# Patient Record
Sex: MALE | Race: OTHER | NOT HISPANIC OR LATINO | URBAN - METROPOLITAN AREA
[De-identification: names, ages, dates, MRNs, and addresses within clinical notes are randomized per-mention and may not be internally consistent; named-entity substitution may affect disease eponyms.]

---

## 2023-04-22 ENCOUNTER — EMERGENCY (EMERGENCY)
Facility: HOSPITAL | Age: 28
LOS: 1 days | Discharge: HOME | End: 2023-04-22
Attending: EMERGENCY MEDICINE | Admitting: EMERGENCY MEDICINE
Payer: COMMERCIAL

## 2023-04-22 VITALS
HEART RATE: 124 BPM | DIASTOLIC BLOOD PRESSURE: 91 MMHG | SYSTOLIC BLOOD PRESSURE: 160 MMHG | TEMPERATURE: 97 F | OXYGEN SATURATION: 99 % | WEIGHT: 179.9 LBS | RESPIRATION RATE: 18 BRPM | HEIGHT: 72 IN

## 2023-04-22 VITALS
HEART RATE: 80 BPM | OXYGEN SATURATION: 96 % | TEMPERATURE: 98 F | RESPIRATION RATE: 18 BRPM | DIASTOLIC BLOOD PRESSURE: 71 MMHG | SYSTOLIC BLOOD PRESSURE: 123 MMHG

## 2023-04-22 DIAGNOSIS — R51.9 HEADACHE, UNSPECIFIED: ICD-10-CM

## 2023-04-22 DIAGNOSIS — Z72.820 SLEEP DEPRIVATION: ICD-10-CM

## 2023-04-22 DIAGNOSIS — F17.290 NICOTINE DEPENDENCE, OTHER TOBACCO PRODUCT, UNCOMPLICATED: ICD-10-CM

## 2023-04-22 LAB
ALBUMIN SERPL ELPH-MCNC: 4.5 G/DL — SIGNIFICANT CHANGE UP (ref 3.3–5)
ALP SERPL-CCNC: 58 U/L — SIGNIFICANT CHANGE UP (ref 40–120)
ALT FLD-CCNC: 12 U/L — SIGNIFICANT CHANGE UP (ref 10–45)
ANION GAP SERPL CALC-SCNC: 10 MMOL/L — SIGNIFICANT CHANGE UP (ref 5–17)
APAP SERPL-MCNC: <5 UG/ML — LOW (ref 10–30)
AST SERPL-CCNC: 18 U/L — SIGNIFICANT CHANGE UP (ref 10–40)
BASOPHILS # BLD AUTO: 0.09 K/UL — SIGNIFICANT CHANGE UP (ref 0–0.2)
BASOPHILS NFR BLD AUTO: 1 % — SIGNIFICANT CHANGE UP (ref 0–2)
BILIRUB SERPL-MCNC: 0.5 MG/DL — SIGNIFICANT CHANGE UP (ref 0.2–1.2)
BUN SERPL-MCNC: 16 MG/DL — SIGNIFICANT CHANGE UP (ref 7–23)
CALCIUM SERPL-MCNC: 8.9 MG/DL — SIGNIFICANT CHANGE UP (ref 8.4–10.5)
CHLORIDE SERPL-SCNC: 100 MMOL/L — SIGNIFICANT CHANGE UP (ref 96–108)
CO2 SERPL-SCNC: 28 MMOL/L — SIGNIFICANT CHANGE UP (ref 22–31)
CREAT SERPL-MCNC: 1.39 MG/DL — HIGH (ref 0.5–1.3)
EGFR: 70 ML/MIN/1.73M2 — SIGNIFICANT CHANGE UP
EOSINOPHIL # BLD AUTO: 0.83 K/UL — HIGH (ref 0–0.5)
EOSINOPHIL NFR BLD AUTO: 8.8 % — HIGH (ref 0–6)
ETHANOL SERPL-MCNC: <10 MG/DL — SIGNIFICANT CHANGE UP (ref 0–10)
GLUCOSE SERPL-MCNC: 99 MG/DL — SIGNIFICANT CHANGE UP (ref 70–99)
HCT VFR BLD CALC: 46.3 % — SIGNIFICANT CHANGE UP (ref 39–50)
HGB BLD-MCNC: 15.7 G/DL — SIGNIFICANT CHANGE UP (ref 13–17)
IMM GRANULOCYTES NFR BLD AUTO: 0.3 % — SIGNIFICANT CHANGE UP (ref 0–0.9)
LYMPHOCYTES # BLD AUTO: 3 K/UL — SIGNIFICANT CHANGE UP (ref 1–3.3)
LYMPHOCYTES # BLD AUTO: 31.7 % — SIGNIFICANT CHANGE UP (ref 13–44)
MCHC RBC-ENTMCNC: 29.5 PG — SIGNIFICANT CHANGE UP (ref 27–34)
MCHC RBC-ENTMCNC: 33.9 GM/DL — SIGNIFICANT CHANGE UP (ref 32–36)
MCV RBC AUTO: 87 FL — SIGNIFICANT CHANGE UP (ref 80–100)
MONOCYTES # BLD AUTO: 0.97 K/UL — HIGH (ref 0–0.9)
MONOCYTES NFR BLD AUTO: 10.3 % — SIGNIFICANT CHANGE UP (ref 2–14)
NEUTROPHILS # BLD AUTO: 4.53 K/UL — SIGNIFICANT CHANGE UP (ref 1.8–7.4)
NEUTROPHILS NFR BLD AUTO: 47.9 % — SIGNIFICANT CHANGE UP (ref 43–77)
NRBC # BLD: 0 /100 WBCS — SIGNIFICANT CHANGE UP (ref 0–0)
PLATELET # BLD AUTO: 204 K/UL — SIGNIFICANT CHANGE UP (ref 150–400)
POTASSIUM SERPL-MCNC: 4 MMOL/L — SIGNIFICANT CHANGE UP (ref 3.5–5.3)
POTASSIUM SERPL-SCNC: 4 MMOL/L — SIGNIFICANT CHANGE UP (ref 3.5–5.3)
PROT SERPL-MCNC: 6.7 G/DL — SIGNIFICANT CHANGE UP (ref 6–8.3)
RBC # BLD: 5.32 M/UL — SIGNIFICANT CHANGE UP (ref 4.2–5.8)
RBC # FLD: 12.6 % — SIGNIFICANT CHANGE UP (ref 10.3–14.5)
SALICYLATES SERPL-MCNC: <0.3 MG/DL — LOW (ref 2.8–20)
SODIUM SERPL-SCNC: 138 MMOL/L — SIGNIFICANT CHANGE UP (ref 135–145)
TROPONIN T SERPL-MCNC: 0.01 NG/ML — SIGNIFICANT CHANGE UP (ref 0–0.01)
WBC # BLD: 9.45 K/UL — SIGNIFICANT CHANGE UP (ref 3.8–10.5)
WBC # FLD AUTO: 9.45 K/UL — SIGNIFICANT CHANGE UP (ref 3.8–10.5)

## 2023-04-22 PROCEDURE — 96375 TX/PRO/DX INJ NEW DRUG ADDON: CPT | Mod: XU

## 2023-04-22 PROCEDURE — 71045 X-RAY EXAM CHEST 1 VIEW: CPT

## 2023-04-22 PROCEDURE — 70450 CT HEAD/BRAIN W/O DYE: CPT | Mod: MA

## 2023-04-22 PROCEDURE — 84484 ASSAY OF TROPONIN QUANT: CPT

## 2023-04-22 PROCEDURE — 80307 DRUG TEST PRSMV CHEM ANLYZR: CPT

## 2023-04-22 PROCEDURE — 70450 CT HEAD/BRAIN W/O DYE: CPT | Mod: 26,MA,59

## 2023-04-22 PROCEDURE — 70498 CT ANGIOGRAPHY NECK: CPT | Mod: 26,MA

## 2023-04-22 PROCEDURE — 99285 EMERGENCY DEPT VISIT HI MDM: CPT

## 2023-04-22 PROCEDURE — 36415 COLL VENOUS BLD VENIPUNCTURE: CPT

## 2023-04-22 PROCEDURE — 70496 CT ANGIOGRAPHY HEAD: CPT | Mod: MA

## 2023-04-22 PROCEDURE — 85025 COMPLETE CBC W/AUTO DIFF WBC: CPT

## 2023-04-22 PROCEDURE — 99284 EMERGENCY DEPT VISIT MOD MDM: CPT | Mod: 25

## 2023-04-22 PROCEDURE — 71045 X-RAY EXAM CHEST 1 VIEW: CPT | Mod: 26

## 2023-04-22 PROCEDURE — 96374 THER/PROPH/DIAG INJ IV PUSH: CPT | Mod: XU

## 2023-04-22 PROCEDURE — 70498 CT ANGIOGRAPHY NECK: CPT | Mod: MA

## 2023-04-22 PROCEDURE — 80053 COMPREHEN METABOLIC PANEL: CPT

## 2023-04-22 PROCEDURE — 70496 CT ANGIOGRAPHY HEAD: CPT | Mod: 26,MA

## 2023-04-22 RX ORDER — METOCLOPRAMIDE HCL 10 MG
10 TABLET ORAL ONCE
Refills: 0 | Status: COMPLETED | OUTPATIENT
Start: 2023-04-22 | End: 2023-04-22

## 2023-04-22 RX ADMIN — Medication 104 MILLIGRAM(S): at 04:30

## 2023-04-22 RX ADMIN — Medication 1 MILLIGRAM(S): at 04:30

## 2023-04-22 NOTE — ED PROVIDER NOTE - PATIENT PORTAL LINK FT
You can access the FollowMyHealth Patient Portal offered by [facility] by registering at the following website: [email or web address]. By joining [email or web address]’s FollowMyHealth portal, you will also be able to view your health information using other applications (apps) compatible with our system.

## 2023-04-22 NOTE — ED ADULT NURSE NOTE - CINV DISCH TEACH PARTICIP
[Follow-Up - Routine Clinic] : a routine clinic follow-up of [None] : No associated symptoms are reported [Good Compliance] : good compliance with treatment [Good Tolerance] : good tolerance of treatment [Good Symptom Control] : good symptom control [de-identified] : APAP Patient/Family

## 2023-04-22 NOTE — ED PROVIDER NOTE - CONSTITUTIONAL, MLM
normal... Well appearing, awake, alert, oriented to person, place, time/situation and in mild distress. anxious appearing

## 2023-04-22 NOTE — ED PROVIDER NOTE - CLINICAL SUMMARY MEDICAL DECISION MAKING FREE TEXT BOX
HA, bitemporal, no focal neuro deficits. likely related to sleep deprivation and caffeine ingestion, less likely cva  -check labs  -ekg  -CXR  -CT/CTA head/neck   -reglan, ativan

## 2023-04-22 NOTE — ED ADULT TRIAGE NOTE - CHIEF COMPLAINT QUOTE
Pt presents with c/o sudden onset "bilateral head pain (to temples) approx 2-3 hours ago. Reports "no sleep x 48 hours" and has been taking OTC stimulants.

## 2023-04-22 NOTE — ED PROVIDER NOTE - OBJECTIVE STATEMENT
28M no PMH c/o HA. pt states he has not slept in past 50hrs, due to work.  has been taking multiple caffeine pills during the day and also drinking caffeine. states tonight he laid down to go to sleep and felt intense headache to both temples.  felt like his heart was racing. no vomiting, no dizziness. no SOB. no numbness/weakness. took tylenol without relief. also took CBD for pain. no fevers. no recent travel.

## 2023-04-22 NOTE — ED PROVIDER NOTE - NSFOLLOWUPINSTRUCTIONS_ED_ALL_ED_FT
General Headache    WHAT YOU NEED TO KNOW:    Headache pain may be mild or severe. Common causes include stress, medicines, and head injuries. Sleep problems, allergies, and hormone changes can also cause a headache. You may have frequent headaches that have no clear cause. Pain may start in another part of your body and move to your head. Headache pain can also move to other parts of your body. A headache can cause other symptoms, such as nausea and vomiting. A severe headache may be a sign of a stroke or other serious problem that needs immediate treatment.    DISCHARGE INSTRUCTIONS:    Have someone call your local emergency number (911 in the [place]) for any of the following:    You have any of the following signs of a stroke:  Numbness or drooping on one side of your face    Weakness in an arm or leg    Confusion or difficulty speaking    Dizziness, a severe headache, or vision loss    Return to the emergency department if:    You have a headache with neck stiffness and a fever.    You have a constant headache and are vomiting.    You have severe pain that does not get better after you take pain medicine.    You have a headache and the pain worsens when you look into light.    You have a headache and vision changes, such as blurred vision.    You have a headache and are forgetful or confused.  Call your doctor if:    You have a headache each day that does not get better, even after treatment.    You have changes in your headaches, or new symptoms that occur when you have a headache.    Others you live or work with also have headaches.    You have questions or concerns about your condition or care.  Medicines: You may need any of the following:    Medicines may be given to prevent or treat headache pain. Do not wait until the pain is severe to take your medicine. Ask your healthcare provider how to take the medicine safely.    NSAIDs, such as ibuprofen, help decrease swelling, pain, and fever. This medicine is available with or without a doctor's order. NSAIDs can cause stomach bleeding or kidney problems in certain people. If you take blood thinner medicine, always ask if NSAIDs are safe for you. Always read the medicine label and follow directions. Do not give these medicines to children younger than 6 months without direction from a healthcare provider.    Acetaminophen decreases pain and fever. It is available without a doctor's order. Ask how much to take and how often to take it. Follow directions. Read the labels of all other medicines you are using to see if they also contain acetaminophen, or ask your doctor or pharmacist. Acetaminophen can cause liver damage if not taken correctly.    Antinausea medicine may be given to calm your stomach and help prevent vomiting.    Take your medicine as directed. Contact your healthcare provider if you think your medicine is not helping or if you have side effects. Tell your provider if you are allergic to any medicine. Keep a list of the medicines, vitamins, and herbs you take. Include the amounts, and when and why you take them. Bring the list or the pill bottles to follow-up visits. Carry your medicine list with you in case of an emergency.  Manage your symptoms:    Rest in a dark and quiet room. This may help decrease your pain.    Apply heat or ice as directed. Heat or ice may help decrease pain or muscle spasms. Apply heat or ice on the area for 20 minutes every 2 hours for as many days as directed. Your healthcare provider may recommend that you alternate heat and ice.    Relax your muscles to help relieve a headache. Lie down in a comfortable position and close your eyes. Relax your muscles slowly. Start at your toes and work your way up your body. A massage or warm bath may also help relax your muscles.  Keep a headache record: Record the dates and times that you get headaches, and what you were doing before the headache started. Also record what you ate and drank in the 24 hours before the headache started. This might help your healthcare provider find the cause of your headaches and make a treatment plan. The record can also help you avoid headache triggers or manage your symptoms.    Get enough sleep: You should get 8 to 10 hours of sleep each night. Create a sleep schedule. Go to bed and wake up at the same times each day. It may be helpful to do something relaxing before bed. Do not watch television right before bed.    Do not smoke: Nicotine and other chemicals in cigarettes and cigars can trigger a headache or make it worse. Ask your healthcare provider for information if you currently smoke and need help to quit. E-cigarettes or smokeless tobacco still contain nicotine. Talk to your healthcare provider before you use these products.    Drink liquids as directed: You may need to drink more liquid to prevent dehydration. Dehydration can cause a headache. Ask your healthcare provider how much liquid to drink each day and which liquids are best for you.    Limit caffeine and alcohol as directed: Your headaches may be triggered by caffeine or alcohol. You may also develop a headache if you drink caffeine regularly and suddenly stop.    Eat a variety of healthy foods: Do not skip meals. Too little food can trigger a headache. Include fruits, vegetables, whole-grain breads, low-fat dairy products, beans, lean meat, and fish. Do not have trigger foods, such as chocolate and red wine. Foods that contain gluten, nitrates, MSG, or artificial sweeteners may also trigger a headache.    Follow up with your doctor as directed: Write down your questions so you remember to ask them during your visits.    Caffeine Use    WHAT YOU NEED TO KNOW:    Caffeine is a stimulant that gives you a burst of energy and keeps you awake. Caffeine can also raise your blood pressure and heart rate. Caffeine occurs naturally in cocoa beans, tea leaves, and coffee beans. It is also added to drinks, food, supplements, medicines, and herbal products.    DISCHARGE INSTRUCTIONS:    Side effects of caffeine: Large amounts of caffeine may cause an upset stomach, shakiness, dizziness, headaches, and trouble focusing and sleeping. The amount of caffeine that would cause these side effects depends on how sensitive you are to caffeine. It also depends on how much caffeine you normally have. You may have side effects with only 1 cup of coffee if you normally do not have caffeine.    Safe intake of caffeine: Caffeine is measured in milligrams (mg). A safe amount for healthy adults is less than 400 mg per day. Pregnant women should limit their caffeine intake to less than 200 mg per day. People with certain medical conditions, children, and older adults may be more sensitive to the effects of caffeine. Talk to your healthcare provider about the amount of caffeine that is safe for you.    Amount of caffeine found in drinks, foods, and medicines: The following list shows the general amount of caffeine that is found in foods, beverages, and medicines. Different brands may have slightly different amounts of caffeine.    16 ounces of energy drinks: 50 mg to 500 mg    12 ounces of caffeinated soda: 23 to 64 mg    5 ounces of coffee (brewed, drip): 60 to 150 mg    6 ounces of instant coffee: 74 mg    5 ounces of brewed tea: 40 to 80 mg    8 ounces of instant tea: 20 to 30 mg    1 bar (41 grams) of dark chocolate: 31 mg    1 ounce of milk chocolate: 1 to 15 mg    5 ounces of hot cocoa: 1 to 8 mg    4 ounces of coffee ice cream: 1 to 45 grams    Weight loss medicine: 300 mg    Medicines for drowsiness: 200 mg    Pain relievers: 64 to 130 mg    Medicine for premenstrual symptoms: 120 mg  Symptoms of caffeine withdrawal: Caffeine withdrawal can occur if you drink more than 200 mg of caffeine each day for at least 2 weeks and then stop. It can occur within 24 hours of stopping or delaying your intake of caffeine. Symptoms of caffeine withdrawal may include headache, sleepiness, nausea, and muscle aches. You may also have trouble concentrating and feel irritable or depressed. Please rest and remain well hydrated with plenty of fluids.  You can take motrin 600-800mg and tylenol 650mg every 3 hours, switching between the two for pain/bodyaches or fevers (>100.4F/>38C)    Please call to arrange follow up with primary care doctor within one week    General Headache    WHAT YOU NEED TO KNOW:    Headache pain may be mild or severe. Common causes include stress, medicines, and head injuries. Sleep problems, allergies, and hormone changes can also cause a headache. You may have frequent headaches that have no clear cause. Pain may start in another part of your body and move to your head. Headache pain can also move to other parts of your body. A headache can cause other symptoms, such as nausea and vomiting. A severe headache may be a sign of a stroke or other serious problem that needs immediate treatment.    DISCHARGE INSTRUCTIONS:    Have someone call your local emergency number (911 in the [place]) for any of the following:    You have any of the following signs of a stroke:  Numbness or drooping on one side of your face    Weakness in an arm or leg    Confusion or difficulty speaking    Dizziness, a severe headache, or vision loss    Return to the emergency department if:    You have a headache with neck stiffness and a fever.    You have a constant headache and are vomiting.    You have severe pain that does not get better after you take pain medicine.    You have a headache and the pain worsens when you look into light.    You have a headache and vision changes, such as blurred vision.    You have a headache and are forgetful or confused.  Call your doctor if:    You have a headache each day that does not get better, even after treatment.    You have changes in your headaches, or new symptoms that occur when you have a headache.    Others you live or work with also have headaches.    You have questions or concerns about your condition or care.  Medicines: You may need any of the following:    Medicines may be given to prevent or treat headache pain. Do not wait until the pain is severe to take your medicine. Ask your healthcare provider how to take the medicine safely.    NSAIDs, such as ibuprofen, help decrease swelling, pain, and fever. This medicine is available with or without a doctor's order. NSAIDs can cause stomach bleeding or kidney problems in certain people. If you take blood thinner medicine, always ask if NSAIDs are safe for you. Always read the medicine label and follow directions. Do not give these medicines to children younger than 6 months without direction from a healthcare provider.    Acetaminophen decreases pain and fever. It is available without a doctor's order. Ask how much to take and how often to take it. Follow directions. Read the labels of all other medicines you are using to see if they also contain acetaminophen, or ask your doctor or pharmacist. Acetaminophen can cause liver damage if not taken correctly.    Antinausea medicine may be given to calm your stomach and help prevent vomiting.    Take your medicine as directed. Contact your healthcare provider if you think your medicine is not helping or if you have side effects. Tell your provider if you are allergic to any medicine. Keep a list of the medicines, vitamins, and herbs you take. Include the amounts, and when and why you take them. Bring the list or the pill bottles to follow-up visits. Carry your medicine list with you in case of an emergency.  Manage your symptoms:    Rest in a dark and quiet room. This may help decrease your pain.    Apply heat or ice as directed. Heat or ice may help decrease pain or muscle spasms. Apply heat or ice on the area for 20 minutes every 2 hours for as many days as directed. Your healthcare provider may recommend that you alternate heat and ice.    Relax your muscles to help relieve a headache. Lie down in a comfortable position and close your eyes. Relax your muscles slowly. Start at your toes and work your way up your body. A massage or warm bath may also help relax your muscles.  Keep a headache record: Record the dates and times that you get headaches, and what you were doing before the headache started. Also record what you ate and drank in the 24 hours before the headache started. This might help your healthcare provider find the cause of your headaches and make a treatment plan. The record can also help you avoid headache triggers or manage your symptoms.    Get enough sleep: You should get 8 to 10 hours of sleep each night. Create a sleep schedule. Go to bed and wake up at the same times each day. It may be helpful to do something relaxing before bed. Do not watch television right before bed.    Do not smoke: Nicotine and other chemicals in cigarettes and cigars can trigger a headache or make it worse. Ask your healthcare provider for information if you currently smoke and need help to quit. E-cigarettes or smokeless tobacco still contain nicotine. Talk to your healthcare provider before you use these products.    Drink liquids as directed: You may need to drink more liquid to prevent dehydration. Dehydration can cause a headache. Ask your healthcare provider how much liquid to drink each day and which liquids are best for you.    Limit caffeine and alcohol as directed: Your headaches may be triggered by caffeine or alcohol. You may also develop a headache if you drink caffeine regularly and suddenly stop.    Eat a variety of healthy foods: Do not skip meals. Too little food can trigger a headache. Include fruits, vegetables, whole-grain breads, low-fat dairy products, beans, lean meat, and fish. Do not have trigger foods, such as chocolate and red wine. Foods that contain gluten, nitrates, MSG, or artificial sweeteners may also trigger a headache.    Follow up with your doctor as directed: Write down your questions so you remember to ask them during your visits.    Caffeine Use    WHAT YOU NEED TO KNOW:    Caffeine is a stimulant that gives you a burst of energy and keeps you awake. Caffeine can also raise your blood pressure and heart rate. Caffeine occurs naturally in cocoa beans, tea leaves, and coffee beans. It is also added to drinks, food, supplements, medicines, and herbal products.    DISCHARGE INSTRUCTIONS:    Side effects of caffeine: Large amounts of caffeine may cause an upset stomach, shakiness, dizziness, headaches, and trouble focusing and sleeping. The amount of caffeine that would cause these side effects depends on how sensitive you are to caffeine. It also depends on how much caffeine you normally have. You may have side effects with only 1 cup of coffee if you normally do not have caffeine.    Safe intake of caffeine: Caffeine is measured in milligrams (mg). A safe amount for healthy adults is less than 400 mg per day. Pregnant women should limit their caffeine intake to less than 200 mg per day. People with certain medical conditions, children, and older adults may be more sensitive to the effects of caffeine. Talk to your healthcare provider about the amount of caffeine that is safe for you.    Amount of caffeine found in drinks, foods, and medicines: The following list shows the general amount of caffeine that is found in foods, beverages, and medicines. Different brands may have slightly different amounts of caffeine.    16 ounces of energy drinks: 50 mg to 500 mg    12 ounces of caffeinated soda: 23 to 64 mg    5 ounces of coffee (brewed, drip): 60 to 150 mg    6 ounces of instant coffee: 74 mg    5 ounces of brewed tea: 40 to 80 mg    8 ounces of instant tea: 20 to 30 mg    1 bar (41 grams) of dark chocolate: 31 mg    1 ounce of milk chocolate: 1 to 15 mg    5 ounces of hot cocoa: 1 to 8 mg    4 ounces of coffee ice cream: 1 to 45 grams    Weight loss medicine: 300 mg    Medicines for drowsiness: 200 mg    Pain relievers: 64 to 130 mg    Medicine for premenstrual symptoms: 120 mg  Symptoms of caffeine withdrawal: Caffeine withdrawal can occur if you drink more than 200 mg of caffeine each day for at least 2 weeks and then stop. It can occur within 24 hours of stopping or delaying your intake of caffeine. Symptoms of caffeine withdrawal may include headache, sleepiness, nausea, and muscle aches. You may also have trouble concentrating and feel irritable or depressed.